# Patient Record
Sex: FEMALE | Race: WHITE | NOT HISPANIC OR LATINO | URBAN - METROPOLITAN AREA
[De-identification: names, ages, dates, MRNs, and addresses within clinical notes are randomized per-mention and may not be internally consistent; named-entity substitution may affect disease eponyms.]

---

## 2017-04-06 ENCOUNTER — APPOINTMENT (RX ONLY)
Dept: URBAN - METROPOLITAN AREA CLINIC 41 | Facility: CLINIC | Age: 47
Setting detail: DERMATOLOGY
End: 2017-04-06

## 2017-04-06 DIAGNOSIS — Z41.9 ENCOUNTER FOR PROCEDURE FOR PURPOSES OTHER THAN REMEDYING HEALTH STATE, UNSPECIFIED: ICD-10-CM

## 2017-04-06 PROCEDURE — ? BOTOX

## 2017-04-06 NOTE — PROCEDURE: BOTOX
Lateral Platysmal Bands Units: 0
Dilution (U/0.1 Cc): 1
Periorbital Skin Units: 16
Consent: Written consent obtained. Risks include but not limited to lid/brow ptosis, bruising, swelling, diplopia temporary effect, incomplete chemical denervation
Lot #: [id number]
Expiration Date (Month Year): 10/2019
Reconstitution Date (Optional): 4/4/17
Detail Level: Detailed

## 2017-05-09 ENCOUNTER — APPOINTMENT (RX ONLY)
Dept: URBAN - METROPOLITAN AREA CLINIC 41 | Facility: CLINIC | Age: 47
Setting detail: DERMATOLOGY
End: 2017-05-09

## 2017-05-09 DIAGNOSIS — Z41.9 ENCOUNTER FOR PROCEDURE FOR PURPOSES OTHER THAN REMEDYING HEALTH STATE, UNSPECIFIED: ICD-10-CM

## 2017-05-09 PROCEDURE — ? BOTOX

## 2017-05-09 NOTE — HPI: COSMETIC (BOTOX)
Have You Had Botox Before?: has had botox
Additional History: Patient would like more injection around the eyes. She does not feel that there was enough improvement.
When Was Your Last Botox Treatment?: 04/06/2017

## 2017-05-09 NOTE — PROCEDURE: BOTOX
Additional Area 6 Units: 0
Periorbital Skin Units: 4
Consent: Written consent obtained. Risks include but not limited to lid/brow ptosis, bruising, swelling, diplopia temporary effect, incomplete chemical denervation
Dilution (U/0.1 Cc): 1
Detail Level: Detailed
Lot #: [id number]
Forehead Units: 8
Expiration Date (Month Year): 11/2019

## 2017-07-20 ENCOUNTER — APPOINTMENT (RX ONLY)
Dept: URBAN - METROPOLITAN AREA CLINIC 41 | Facility: CLINIC | Age: 47
Setting detail: DERMATOLOGY
End: 2017-07-20

## 2017-07-20 DIAGNOSIS — L82.1 OTHER SEBORRHEIC KERATOSIS: ICD-10-CM

## 2017-07-20 DIAGNOSIS — Z41.9 ENCOUNTER FOR PROCEDURE FOR PURPOSES OTHER THAN REMEDYING HEALTH STATE, UNSPECIFIED: ICD-10-CM

## 2017-07-20 PROCEDURE — ? BOTOX

## 2017-07-20 PROCEDURE — ? COUNSELING

## 2017-07-20 ASSESSMENT — LOCATION SIMPLE DESCRIPTION DERM: LOCATION SIMPLE: LEFT LATERAL INFERIOR PRETARSAL REGION

## 2017-07-20 ASSESSMENT — LOCATION DETAILED DESCRIPTION DERM: LOCATION DETAILED: LEFT LATERAL INFERIOR PRETARSAL REGION

## 2017-07-20 ASSESSMENT — LOCATION ZONE DERM: LOCATION ZONE: EYELID

## 2017-07-20 NOTE — PROCEDURE: BOTOX
Nasal Root Units: 0
Consent: Written consent obtained. Risks include but not limited to lid/brow ptosis, bruising, swelling, diplopia temporary effect, incomplete chemical denervation
Lot #: [id number]
Additional Area 1 Location: forehead and periorbital skin
Expiration Date (Month Year): 02/2020
Dilution (U/0.1 Cc): 1
Detail Level: Simple
Periorbital Skin Units: 16
Glabellar Complex Units: 2

## 2017-10-05 ENCOUNTER — APPOINTMENT (RX ONLY)
Dept: URBAN - METROPOLITAN AREA CLINIC 41 | Facility: CLINIC | Age: 47
Setting detail: DERMATOLOGY
End: 2017-10-05

## 2017-10-05 DIAGNOSIS — Z41.9 ENCOUNTER FOR PROCEDURE FOR PURPOSES OTHER THAN REMEDYING HEALTH STATE, UNSPECIFIED: ICD-10-CM

## 2017-10-05 PROCEDURE — ? BOTOX

## 2017-10-05 NOTE — PROCEDURE: BOTOX
Consent: Written consent obtained. Risks include but not limited to lid/brow ptosis, bruising, swelling, diplopia temporary effect, incomplete chemical denervation
Lot #: [id number]
Nasal Root Units: 0
Expiration Date (Month Year): 04/2020
Periorbital Skin Units: 20
Additional Area 1 Location: Periorbital skin, Glabella, forehead
Dilution (U/0.1 Cc): 1
Glabellar Complex Units: 2
Reconstitution Date (Optional): 10/5/17
Detail Level: Simple

## 2017-12-06 ENCOUNTER — APPOINTMENT (RX ONLY)
Dept: URBAN - METROPOLITAN AREA CLINIC 41 | Facility: CLINIC | Age: 47
Setting detail: DERMATOLOGY
End: 2017-12-06

## 2017-12-06 DIAGNOSIS — Z41.9 ENCOUNTER FOR PROCEDURE FOR PURPOSES OTHER THAN REMEDYING HEALTH STATE, UNSPECIFIED: ICD-10-CM

## 2017-12-06 PROCEDURE — ? BOTOX

## 2017-12-06 NOTE — PROCEDURE: BOTOX
Expiration Date (Month Year): 6/2020
Nasal Root Units: 0
Reconstitution Date (Optional): 12/6/2017
Additional Area 1 Location: forehead
Consent: Written consent obtained. Risks include but not limited to lid/brow ptosis, bruising, swelling, diplopia temporary effect, incomplete chemical denervation
Forehead Units: 8
Dilution (U/0.1 Cc): 1
Lot #: [id number]
Detail Level: Simple

## 2018-01-12 ENCOUNTER — APPOINTMENT (RX ONLY)
Dept: URBAN - METROPOLITAN AREA CLINIC 17 | Facility: CLINIC | Age: 48
Setting detail: DERMATOLOGY
End: 2018-01-12

## 2018-01-12 DIAGNOSIS — Z41.9 ENCOUNTER FOR PROCEDURE FOR PURPOSES OTHER THAN REMEDYING HEALTH STATE, UNSPECIFIED: ICD-10-CM

## 2018-01-12 PROCEDURE — ? BOTOX

## 2018-01-12 NOTE — PROCEDURE: BOTOX
Additional Area 1 Location: periorbital skin and forehead
Lateral Platysmal Bands Units: 0
Periorbital Skin Units: 16
Dilution (U/0.1 Cc): 1
Lot #: [id number]
Expiration Date (Month Year): 4/2020
Detail Level: Simple
Consent: Written consent obtained. Risks include but not limited to lid/brow ptosis, bruising, swelling, diplopia temporary effect, incomplete chemical denervation

## 2018-03-28 ENCOUNTER — APPOINTMENT (RX ONLY)
Dept: URBAN - METROPOLITAN AREA CLINIC 41 | Facility: CLINIC | Age: 48
Setting detail: DERMATOLOGY
End: 2018-03-28

## 2018-03-28 DIAGNOSIS — Z41.9 ENCOUNTER FOR PROCEDURE FOR PURPOSES OTHER THAN REMEDYING HEALTH STATE, UNSPECIFIED: ICD-10-CM

## 2018-03-28 PROCEDURE — ? BOTOX

## 2018-03-28 NOTE — PROCEDURE: BOTOX
Nasal Root Units: 0
Additional Area 1 Location: forehead, glabella, and periorbital skin
Lot #: [id number]
Consent: Written consent obtained. Risks include but not limited to lid/brow ptosis, bruising, swelling, diplopia temporary effect, incomplete chemical denervation
Detail Level: Simple
Dilution (U/0.1 Cc): 1
Periorbital Skin Units: 16
Expiration Date (Month Year): 08/2020

## 2018-05-11 ENCOUNTER — APPOINTMENT (RX ONLY)
Dept: URBAN - METROPOLITAN AREA CLINIC 41 | Facility: CLINIC | Age: 48
Setting detail: DERMATOLOGY
End: 2018-05-11

## 2018-05-11 DIAGNOSIS — Z41.9 ENCOUNTER FOR PROCEDURE FOR PURPOSES OTHER THAN REMEDYING HEALTH STATE, UNSPECIFIED: ICD-10-CM

## 2018-05-11 PROCEDURE — ? BOTOX

## 2018-05-11 NOTE — PROCEDURE: BOTOX
Consent: Written consent obtained. Risks include but not limited to lid/brow ptosis, bruising, swelling, diplopia temporary effect, incomplete chemical denervation
Additional Area 6 Units: 0
Detail Level: Simple
Additional Area 1 Units: 9
Expiration Date (Month Year): 06/2020
Additional Area 2 Location: chin and periorbital
Lot #: [id number]
Additional Area 1 Location: glabella, forehead, periorbital
Dilution (U/0.1 Cc): 1

## 2018-07-25 ENCOUNTER — APPOINTMENT (RX ONLY)
Dept: URBAN - METROPOLITAN AREA CLINIC 41 | Facility: CLINIC | Age: 48
Setting detail: DERMATOLOGY
End: 2018-07-25

## 2018-07-25 DIAGNOSIS — Z41.9 ENCOUNTER FOR PROCEDURE FOR PURPOSES OTHER THAN REMEDYING HEALTH STATE, UNSPECIFIED: ICD-10-CM

## 2018-07-25 PROCEDURE — ? BOTOX

## 2018-07-25 NOTE — PROCEDURE: BOTOX
Forehead Units: 0
Dilution (U/0.1 Cc): 1
Additional Area 1 Location: glabella, periorbital skin
Additional Area 2 Location: chin
Expiration Date (Month Year): 12/2020
Lot #: [id number]
Additional Area 1 Units: 18
Consent: Written consent obtained. Risks include but not limited to lid/brow ptosis, bruising, swelling, diplopia temporary effect, incomplete chemical denervation
Detail Level: Simple

## 2018-10-17 ENCOUNTER — APPOINTMENT (RX ONLY)
Dept: URBAN - METROPOLITAN AREA CLINIC 41 | Facility: CLINIC | Age: 48
Setting detail: DERMATOLOGY
End: 2018-10-17

## 2018-10-17 DIAGNOSIS — Z41.9 ENCOUNTER FOR PROCEDURE FOR PURPOSES OTHER THAN REMEDYING HEALTH STATE, UNSPECIFIED: ICD-10-CM

## 2018-10-17 PROCEDURE — ? BOTOX

## 2018-12-12 ENCOUNTER — APPOINTMENT (RX ONLY)
Dept: URBAN - METROPOLITAN AREA CLINIC 41 | Facility: CLINIC | Age: 48
Setting detail: DERMATOLOGY
End: 2018-12-12

## 2018-12-12 DIAGNOSIS — Z41.9 ENCOUNTER FOR PROCEDURE FOR PURPOSES OTHER THAN REMEDYING HEALTH STATE, UNSPECIFIED: ICD-10-CM

## 2018-12-12 PROCEDURE — ? BOTOX

## 2018-12-12 NOTE — PROCEDURE: BOTOX
Forehead Units: 0
Expiration Date (Month Year): 04/2021
Consent: Written consent obtained. Risks include but not limited to lid/brow ptosis, bruising, swelling, diplopia temporary effect, incomplete chemical denervation
Additional Area 2 Location: chin
Detail Level: Simple
Lot #: [id number]
Dilution (U/0.1 Cc): 1
Additional Area 1 Location: glabella, forehead, periorbital skin,
Additional Area 1 Units: 9

## 2019-01-16 ENCOUNTER — APPOINTMENT (RX ONLY)
Dept: URBAN - METROPOLITAN AREA CLINIC 41 | Facility: CLINIC | Age: 49
Setting detail: DERMATOLOGY
End: 2019-01-16

## 2019-01-16 DIAGNOSIS — Z41.9 ENCOUNTER FOR PROCEDURE FOR PURPOSES OTHER THAN REMEDYING HEALTH STATE, UNSPECIFIED: ICD-10-CM

## 2019-01-16 PROCEDURE — ? BOTOX

## 2019-01-16 NOTE — PROCEDURE: BOTOX
Periorbital Skin Units: 0
Consent: Written consent obtained. Risks include but not limited to lid/brow ptosis, bruising, swelling, diplopia temporary effect, incomplete chemical denervation
Dilution (U/0.1 Cc): 1
Additional Area 2 Location: periorbital
Additional Area 3 Location: chin
Lot #: [id number]
Expiration Date (Month Year): 05/2021
Additional Area 1 Units: 20
Additional Area 1 Location: glabella, forehead, vel ocular
Detail Level: Simple

## 2019-04-30 ENCOUNTER — APPOINTMENT (RX ONLY)
Dept: URBAN - METROPOLITAN AREA CLINIC 41 | Facility: CLINIC | Age: 49
Setting detail: DERMATOLOGY
End: 2019-04-30

## 2019-04-30 DIAGNOSIS — Z41.9 ENCOUNTER FOR PROCEDURE FOR PURPOSES OTHER THAN REMEDYING HEALTH STATE, UNSPECIFIED: ICD-10-CM

## 2019-04-30 PROCEDURE — ? BOTOX

## 2019-04-30 NOTE — PROCEDURE: BOTOX
Inferior Lateral Orbicularis Oculi Units: 0
Additional Area 5 Location: glabella
Expiration Date (Month Year): 09/2021
Detail Level: Simple
Additional Area 2 Location: forehead, glabella
Additional Area 1 Location: forehead, periorbital, glabella
Additional Area 3 Location: vel ocular
Dilution (U/0.1 Cc): 1
Additional Area 4 Location: neck
Lot #: [id number]
Additional Area 1 Units: 20
Consent: Written consent obtained. Risks include but not limited to lid/brow ptosis, bruising, swelling, diplopia temporary effect, incomplete chemical denervation

## 2019-08-14 ENCOUNTER — APPOINTMENT (RX ONLY)
Dept: URBAN - METROPOLITAN AREA CLINIC 41 | Facility: CLINIC | Age: 49
Setting detail: DERMATOLOGY
End: 2019-08-14

## 2019-08-14 DIAGNOSIS — Z41.9 ENCOUNTER FOR PROCEDURE FOR PURPOSES OTHER THAN REMEDYING HEALTH STATE, UNSPECIFIED: ICD-10-CM

## 2019-08-14 PROCEDURE — ? BOTOX

## 2019-08-14 NOTE — PROCEDURE: BOTOX
Additional Area 5 Location: glabella
Lateral Platysmal Bands Units: 0
Additional Area 2 Location: forehead, glabella
Detail Level: Simple
Additional Area 4 Location: neck
Additional Area 1 Location: forehead, periorbital, glabella
Expiration Date (Month Year): 09/2021
Additional Area 1 Units: 20
Lot #: [id number]
Dilution (U/0.1 Cc): 1
Additional Area 3 Location: vel ocular
Consent: Written consent obtained. Risks include but not limited to lid/brow ptosis, bruising, swelling, diplopia temporary effect, incomplete chemical denervation

## 2019-11-26 ENCOUNTER — APPOINTMENT (RX ONLY)
Dept: URBAN - METROPOLITAN AREA CLINIC 41 | Facility: CLINIC | Age: 49
Setting detail: DERMATOLOGY
End: 2019-11-26

## 2019-11-26 DIAGNOSIS — L81.4 OTHER MELANIN HYPERPIGMENTATION: ICD-10-CM

## 2019-11-26 DIAGNOSIS — Z41.9 ENCOUNTER FOR PROCEDURE FOR PURPOSES OTHER THAN REMEDYING HEALTH STATE, UNSPECIFIED: ICD-10-CM

## 2019-11-26 DIAGNOSIS — L82.1 OTHER SEBORRHEIC KERATOSIS: ICD-10-CM

## 2019-11-26 PROCEDURE — ? BOTOX

## 2019-11-26 PROCEDURE — 99213 OFFICE O/P EST LOW 20 MIN: CPT

## 2019-11-26 PROCEDURE — ? LIQUID NITROGEN (COSMETIC)

## 2019-11-26 PROCEDURE — ? COUNSELING

## 2019-11-26 ASSESSMENT — LOCATION ZONE DERM
LOCATION ZONE: TRUNK
LOCATION ZONE: ARM
LOCATION ZONE: LEG

## 2019-11-26 ASSESSMENT — LOCATION DETAILED DESCRIPTION DERM
LOCATION DETAILED: LEFT POSTERIOR SHOULDER
LOCATION DETAILED: RIGHT SUPERIOR MEDIAL MIDBACK
LOCATION DETAILED: LEFT VENTRAL PROXIMAL FOREARM
LOCATION DETAILED: LEFT DISTAL POSTERIOR UPPER ARM
LOCATION DETAILED: LEFT DISTAL DORSAL FOREARM
LOCATION DETAILED: LEFT PROXIMAL DORSAL FOREARM
LOCATION DETAILED: LEFT SUPERIOR MEDIAL UPPER BACK
LOCATION DETAILED: RIGHT POSTERIOR SHOULDER
LOCATION DETAILED: RIGHT SUPERIOR UPPER BACK
LOCATION DETAILED: RIGHT PROXIMAL PRETIBIAL REGION

## 2019-11-26 ASSESSMENT — LOCATION SIMPLE DESCRIPTION DERM
LOCATION SIMPLE: LEFT UPPER BACK
LOCATION SIMPLE: LEFT POSTERIOR UPPER ARM
LOCATION SIMPLE: RIGHT PRETIBIAL REGION
LOCATION SIMPLE: RIGHT SHOULDER
LOCATION SIMPLE: LEFT FOREARM
LOCATION SIMPLE: RIGHT UPPER BACK
LOCATION SIMPLE: RIGHT LOWER BACK
LOCATION SIMPLE: LEFT SHOULDER

## 2019-11-26 NOTE — PROCEDURE: BOTOX
Show Depressor Anguli Units: Yes
Show Mentalis Units: No
Lateral Platysmal Bands Units: 0
Additional Area 2 Location: periorbital
Additional Area 4 Location: chin
Additional Area 3 Location: lips
Expiration Date (Month Year): 05/2022
Lot #: [id number]
Additional Area 1 Location: forehead, glabella, periorbital skin
Additional Area 5 Location: glabella
Consent: Written consent obtained. Risks include but not limited to lid/brow ptosis, bruising, swelling, diplopia temporary effect, incomplete chemical denervation
Additional Area 1 Units: 29
Dilution (U/0.1 Cc): 1
Detail Level: Detailed

## 2020-02-19 ENCOUNTER — APPOINTMENT (RX ONLY)
Dept: URBAN - METROPOLITAN AREA CLINIC 41 | Facility: CLINIC | Age: 50
Setting detail: DERMATOLOGY
End: 2020-02-19

## 2020-02-19 DIAGNOSIS — Z41.9 ENCOUNTER FOR PROCEDURE FOR PURPOSES OTHER THAN REMEDYING HEALTH STATE, UNSPECIFIED: ICD-10-CM

## 2020-02-19 PROCEDURE — ? BOTOX

## 2020-02-19 ASSESSMENT — LOCATION ZONE DERM: LOCATION ZONE: FACE

## 2020-02-19 ASSESSMENT — LOCATION DETAILED DESCRIPTION DERM
LOCATION DETAILED: RIGHT MID TEMPLE
LOCATION DETAILED: LEFT SUPERIOR LATERAL MALAR CHEEK
LOCATION DETAILED: RIGHT INFERIOR TEMPLE
LOCATION DETAILED: RIGHT SUPERIOR CENTRAL MALAR CHEEK
LOCATION DETAILED: LEFT LATERAL EYEBROW
LOCATION DETAILED: RIGHT LATERAL EYEBROW
LOCATION DETAILED: LEFT INFERIOR TEMPLE
LOCATION DETAILED: LEFT SUPERIOR CENTRAL MALAR CHEEK

## 2020-02-19 ASSESSMENT — LOCATION SIMPLE DESCRIPTION DERM
LOCATION SIMPLE: LEFT CHEEK
LOCATION SIMPLE: RIGHT TEMPLE
LOCATION SIMPLE: RIGHT EYEBROW
LOCATION SIMPLE: LEFT EYEBROW
LOCATION SIMPLE: LEFT TEMPLE
LOCATION SIMPLE: RIGHT CHEEK

## 2020-02-19 NOTE — PROCEDURE: BOTOX
Additional Area 5 Units: 16
Show Additional Area 4: Yes
[facility] Units: 0
Show Right And Left Brow Units: No
Detail Level: Detailed
Lot #: [id number]
Consent: Written consent obtained. Risks include but not limited to lid/brow ptosis, bruising, swelling, diplopia temporary effect, incomplete chemical denervation
Additional Area 5 Location: periorbital
Expiration Date (Month Year): 06/2022
Additional Area 4 Location: chin
Additional Area 3 Location: nose
Additional Area 2 Location: upper cutaneous lip
Dilution (U/0.1 Cc): 1
Additional Area 1 Location: forehead,glabella, periorbital skin

## 2020-06-03 ENCOUNTER — APPOINTMENT (RX ONLY)
Dept: URBAN - METROPOLITAN AREA CLINIC 41 | Facility: CLINIC | Age: 50
Setting detail: DERMATOLOGY
End: 2020-06-03

## 2020-06-03 DIAGNOSIS — Z41.9 ENCOUNTER FOR PROCEDURE FOR PURPOSES OTHER THAN REMEDYING HEALTH STATE, UNSPECIFIED: ICD-10-CM

## 2020-06-03 PROCEDURE — ? BOTOX (U OR CC)

## 2020-06-03 ASSESSMENT — LOCATION SIMPLE DESCRIPTION DERM
LOCATION SIMPLE: RIGHT EYELID
LOCATION SIMPLE: LEFT CHEEK
LOCATION SIMPLE: LEFT EYEBROW
LOCATION SIMPLE: RIGHT EYEBROW
LOCATION SIMPLE: LEFT TEMPLE
LOCATION SIMPLE: RIGHT CHEEK
LOCATION SIMPLE: RIGHT TEMPLE

## 2020-06-03 ASSESSMENT — LOCATION DETAILED DESCRIPTION DERM
LOCATION DETAILED: LEFT MEDIAL EYEBROW
LOCATION DETAILED: RIGHT SUPERIOR CENTRAL MALAR CHEEK
LOCATION DETAILED: RIGHT INFERIOR TEMPLE
LOCATION DETAILED: RIGHT MEDIAL EYEBROW
LOCATION DETAILED: LEFT LATERAL EYEBROW
LOCATION DETAILED: LEFT MID TEMPLE
LOCATION DETAILED: LEFT SUPERIOR LATERAL MALAR CHEEK
LOCATION DETAILED: RIGHT LATERAL CANTHUS
LOCATION DETAILED: RIGHT LATERAL EYEBROW
LOCATION DETAILED: LEFT INFERIOR TEMPLE

## 2020-06-03 ASSESSMENT — LOCATION ZONE DERM
LOCATION ZONE: EYELID
LOCATION ZONE: FACE

## 2020-06-03 NOTE — PROCEDURE: BOTOX (U OR CC)
Detail Level: Detailed
Levator Labii Superioris Units: 0
Additional Area 1 Location: forehead, glabella
Additional Area 1 Units: 20
Document As Units Or Cc?: units
Additional Area 3 Location: chin, perioral
Consent: Written consent obtained. Risks include but not limited to lid/brow ptosis, bruising, swelling, diplopia, temporary effect, incomplete chemical denervation.
Lot #: [id number]
Post-Care Instructions: Patient instructed to not lie down for 4 hours and limit physical activity for 24 hours. Patient instructed not to travel by airplane for 48 hours.
Including Pricing Information In The Note: No
Additional Area 2 Location: vel-ocular
Dilution (U/0.1 Cc): 1
Expiration Date (Month Year): 09/2020
Quantity Per Injection Site (Units Or Cc): 2 U

## 2020-09-02 ENCOUNTER — APPOINTMENT (RX ONLY)
Dept: URBAN - METROPOLITAN AREA CLINIC 41 | Facility: CLINIC | Age: 50
Setting detail: DERMATOLOGY
End: 2020-09-02

## 2020-09-02 DIAGNOSIS — Z41.9 ENCOUNTER FOR PROCEDURE FOR PURPOSES OTHER THAN REMEDYING HEALTH STATE, UNSPECIFIED: ICD-10-CM

## 2020-09-02 PROCEDURE — ? BOTOX

## 2020-09-02 NOTE — PROCEDURE: BOTOX
Periorbital Skin Units: 0
Show Nasal Units: Yes
Consent: Written consent obtained. Risks include but not limited to lid/brow ptosis, bruising, swelling, diplopia temporary effect, incomplete chemical denervation
Show Right And Left Brow Units: No
Additional Area 1 Units: 18
Additional Area 2 Location: upper lip
Additional Area 4 Location: chin
Lot #: [id number]
Dilution (U/0.1 Cc): 1
Detail Level: Simple
Additional Area 5 Location: neck
Expiration Date (Month Year): 04/23
Additional Area 3 Location: see diagram
Additional Area 1 Location: forehead,glabella, periorbital

## 2021-09-09 ENCOUNTER — APPOINTMENT (RX ONLY)
Dept: URBAN - METROPOLITAN AREA CLINIC 41 | Facility: CLINIC | Age: 51
Setting detail: DERMATOLOGY
End: 2021-09-09

## 2021-09-09 DIAGNOSIS — Z41.9 ENCOUNTER FOR PROCEDURE FOR PURPOSES OTHER THAN REMEDYING HEALTH STATE, UNSPECIFIED: ICD-10-CM

## 2021-09-09 PROCEDURE — ? BOTOX

## 2021-09-09 NOTE — PROCEDURE: BOTOX
Show Lateral Platysmal Band Units: Yes
Nasal Root Units: 0
Additional Area 2 Location: chin
Show Right And Left Brow Units: No
Additional Area 1 Location: glabella, forehead,
Consent: Written consent obtained. Risks include but not limited to lid/brow ptosis, bruising, swelling, diplopia temporary effect, incomplete chemical denervation
Lot #: [id number]
Detail Level: Simple
Expiration Date (Month Year): 10/2023
Additional Area 3 Units: 18
Additional Area 5 Location: neck
Additional Area 3 Location: see diagram
Dilution (U/0.1 Cc): 1

## 2021-12-09 ENCOUNTER — APPOINTMENT (RX ONLY)
Dept: URBAN - METROPOLITAN AREA CLINIC 41 | Facility: CLINIC | Age: 51
Setting detail: DERMATOLOGY
End: 2021-12-09

## 2021-12-09 DIAGNOSIS — Z41.9 ENCOUNTER FOR PROCEDURE FOR PURPOSES OTHER THAN REMEDYING HEALTH STATE, UNSPECIFIED: ICD-10-CM

## 2021-12-09 PROCEDURE — ? BOTOX

## 2021-12-09 NOTE — PROCEDURE: BOTOX
Left Periorbital Skin Units: 0
Show Glabellar Units: Yes
Additional Area 6 Location: see diagram
Dilution (U/0.1 Cc): 1
Additional Area 5 Location: neck
Additional Area 1 Location: forehead and glabella
Show Right And Left Periorbital Units: No
Additional Area 4 Location: chin
Detail Level: Simple
Additional Area 3 Location: upper lip
Additional Area 6 Units: 20
Expiration Date (Month Year): 10/2023
Consent: Written consent obtained. Risks include but not limited to lid/brow ptosis, bruising, swelling, diplopia temporary effect, incomplete chemical denervation
Lot #: [id number]

## 2022-03-30 ENCOUNTER — APPOINTMENT (RX ONLY)
Dept: URBAN - METROPOLITAN AREA CLINIC 41 | Facility: CLINIC | Age: 52
Setting detail: DERMATOLOGY
End: 2022-03-30

## 2022-03-30 DIAGNOSIS — Z41.9 ENCOUNTER FOR PROCEDURE FOR PURPOSES OTHER THAN REMEDYING HEALTH STATE, UNSPECIFIED: ICD-10-CM

## 2022-03-30 PROCEDURE — ? BOTOX

## 2022-03-30 NOTE — PROCEDURE: BOTOX
Show Right And Left Periorbital Units: No
Additional Area 3 Location: upper lip
Lcl Root Units: 0
Show Additional Area 4: Yes
Dilution (U/0.1 Cc): 1
Detail Level: Simple
Additional Area 6 Location: see diagram
Consent: Written consent obtained. Risks include but not limited to lid/brow ptosis, bruising, swelling, diplopia temporary effect, incomplete chemical denervation
Additional Area 1 Units: 20
Additional Area 2 Location: periocular skin
Additional Area 5 Location: neck
Expiration Date (Month Year): 04/24
Additional Area 1 Location: forehead and glabella
Additional Area 4 Location: chin
Lot #: [id number]

## 2022-07-13 ENCOUNTER — APPOINTMENT (RX ONLY)
Dept: URBAN - METROPOLITAN AREA CLINIC 41 | Facility: CLINIC | Age: 52
Setting detail: DERMATOLOGY
End: 2022-07-13

## 2022-07-13 DIAGNOSIS — Z41.9 ENCOUNTER FOR PROCEDURE FOR PURPOSES OTHER THAN REMEDYING HEALTH STATE, UNSPECIFIED: ICD-10-CM

## 2022-07-13 PROCEDURE — ? BOTOX

## 2022-07-13 NOTE — PROCEDURE: BOTOX
Forehead Units: 0
Show Nasal Units: Yes
Consent: Written consent obtained. Risks include but not limited to lid/brow ptosis, bruising, swelling, diplopia temporary effect, incomplete chemical denervation
Additional Area 5 Location: neck
Show Right And Left Brow Units: No
Detail Level: Simple
Additional Area 3 Location: upper lip
Additional Area 6 Location: see diagram
Lot #: [id number]
Expiration Date (Month Year): 07/24
Additional Area 4 Location: chin
Additional Area 6 Units: 18
Dilution (U/0.1 Cc): 1
Additional Area 2 Location: periocular skin

## 2022-11-02 ENCOUNTER — APPOINTMENT (RX ONLY)
Dept: URBAN - METROPOLITAN AREA CLINIC 41 | Facility: CLINIC | Age: 52
Setting detail: DERMATOLOGY
End: 2022-11-02

## 2022-11-02 DIAGNOSIS — Z41.9 ENCOUNTER FOR PROCEDURE FOR PURPOSES OTHER THAN REMEDYING HEALTH STATE, UNSPECIFIED: ICD-10-CM

## 2022-11-02 PROCEDURE — ? BOTOX

## 2022-11-02 NOTE — PROCEDURE: BOTOX
Consent: Written consent obtained. Risks include but not limited to lid/brow ptosis, bruising, swelling, diplopia temporary effect, incomplete chemical denervation
Anterior Platysmal Bands Units: 0
Show Additional Area 6: Yes
Additional Area 2 Location: periocular skin
Additional Area 1 Location: forehead, glabella
Show Right And Left Brow Units: No
Additional Area 2 Units: 18
Lot #: [id number]
Additional Area 5 Location: neck
Additional Area 3 Location: upper lip
Expiration Date (Month Year): 07/31/2024
Dilution (U/0.1 Cc): 1
Detail Level: Simple
Additional Area 6 Location: see diagram
Additional Area 4 Location: chin

## 2023-02-09 ENCOUNTER — APPOINTMENT (RX ONLY)
Dept: URBAN - METROPOLITAN AREA CLINIC 41 | Facility: CLINIC | Age: 53
Setting detail: DERMATOLOGY
End: 2023-02-09

## 2023-02-09 DIAGNOSIS — Z41.9 ENCOUNTER FOR PROCEDURE FOR PURPOSES OTHER THAN REMEDYING HEALTH STATE, UNSPECIFIED: ICD-10-CM

## 2023-02-09 PROCEDURE — ? BOTOX

## 2023-02-09 NOTE — PROCEDURE: BOTOX
Additional Area 3 Units: 0
Lot #: [id number]
Additional Area 2 Location: periocular skin
Show Periorbital Units: Yes
Expiration Date (Month Year): 07/2025
Incrementing Botox Units: By 0.5 Units
Show Right And Left Pupillary Line Units: No
Dilution (U/0.1 Cc): 1
Detail Level: Simple
Additional Area 6 Location: see diagram
Additional Area 1 Location: forehead, glabella
Consent: Written consent obtained. Risks include but not limited to lid/brow ptosis, bruising, swelling, diplopia temporary effect, incomplete chemical denervation
Additional Area 5 Location: neck
Additional Area 4 Location: chin
Additional Area 6 Units: 18
Additional Area 3 Location: upper lip

## 2023-05-18 ENCOUNTER — APPOINTMENT (RX ONLY)
Dept: URBAN - METROPOLITAN AREA CLINIC 41 | Facility: CLINIC | Age: 53
Setting detail: DERMATOLOGY
End: 2023-05-18

## 2023-05-18 DIAGNOSIS — Z41.9 ENCOUNTER FOR PROCEDURE FOR PURPOSES OTHER THAN REMEDYING HEALTH STATE, UNSPECIFIED: ICD-10-CM

## 2023-05-18 PROCEDURE — ? BOTOX

## 2023-05-18 NOTE — PROCEDURE: BOTOX
Depressor Anguli Oris Units: 0
Show Right And Left Brow Units: No
Additional Area 5 Location: neck
Additional Area 4 Location: chin
Show Additional Area 2: Yes
Additional Area 3 Location: upper lip
Additional Area 2 Location: periocular skin
Additional Area 6 Units: 18
Expiration Date (Month Year): 07/2025
Incrementing Botox Units: By 0.1 Units
Lot #: [id number]
Additional Area 6 Location: see diagram
Additional Area 1 Location: forehead, glabella
Dilution (U/0.1 Cc): 1
Detail Level: Simple
Consent: Written consent obtained. Risks include but not limited to lid/brow ptosis, bruising, swelling, diplopia temporary effect, incomplete chemical denervation

## 2023-05-30 NOTE — PROCEDURE: BOTOX
-- DO NOT REPLY / DO NOT REPLY ALL --  -- Message is from [facility] ([facility]) --    General Patient Message: Patient is calling asking for a call back from  The nurse      Caller Information       Type Contact Phone/Fax    05/30/2023 10:19 AM CDT Phone (Incoming) Xin Ronny RONNIE (Self) [phone] (M)        Alternative phone number: none    Can a detailed message be left? No    Message Turnaround: [place]:    Refer to site's KB page for routing instructions    Please give this turnaround time to the caller:   \"You can expect to receive a response 2-3 business days after your provider's clinical team reviews the message\"              
Additional Area 2 Location: chin
Additional Area 4 Units: 0
Additional Area 1 Location: glabella, forehead, periorbital skin,
Consent: Written consent obtained. Risks include but not limited to lid/brow ptosis, bruising, swelling, diplopia temporary effect, incomplete chemical denervation
Dilution (U/0.1 Cc): 1
Detail Level: Simple
Additional Area 1 Units: 18
Lot #: [id number]
Expiration Date (Month Year): 04/2021

## 2023-08-30 ENCOUNTER — APPOINTMENT (RX ONLY)
Dept: URBAN - METROPOLITAN AREA CLINIC 41 | Facility: CLINIC | Age: 53
Setting detail: DERMATOLOGY
End: 2023-08-30

## 2023-08-30 DIAGNOSIS — Z41.9 ENCOUNTER FOR PROCEDURE FOR PURPOSES OTHER THAN REMEDYING HEALTH STATE, UNSPECIFIED: ICD-10-CM

## 2023-08-30 PROCEDURE — ? BOTOX

## 2023-08-30 NOTE — PROCEDURE: BOTOX
Additional Area 4 Location: chin
L Brow Units: 0
Show Additional Area 3: Yes
Additional Area 3 Location: upper lip
Show Ucl Units: No
Expiration Date (Month Year): 04/30/2026
Additional Area 6 Units: 18
Additional Area 2 Location: periocular skin
Lot #: [id number]
Consent: Written consent obtained. Risks include but not limited to lid/brow ptosis, bruising, swelling, diplopia temporary effect, incomplete chemical denervation
Dilution (U/0.1 Cc): 1
Incrementing Botox Units: By 0.1 Units
Additional Area 6 Location: see diagram
Additional Area 1 Location: forehead, glabella
Additional Area 5 Location: neck
Detail Level: Zone